# Patient Record
Sex: FEMALE | Race: WHITE | NOT HISPANIC OR LATINO | Employment: STUDENT | URBAN - METROPOLITAN AREA
[De-identification: names, ages, dates, MRNs, and addresses within clinical notes are randomized per-mention and may not be internally consistent; named-entity substitution may affect disease eponyms.]

---
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
## 2021-01-04 ENCOUNTER — HEALTH MAINTENANCE LETTER (OUTPATIENT)
Age: 21
End: 2021-01-04

## 2021-10-11 ENCOUNTER — HEALTH MAINTENANCE LETTER (OUTPATIENT)
Age: 21
End: 2021-10-11

## 2022-01-30 ENCOUNTER — HEALTH MAINTENANCE LETTER (OUTPATIENT)
Age: 22
End: 2022-01-30

## 2022-02-09 ENCOUNTER — APPOINTMENT (OUTPATIENT)
Dept: GENERAL RADIOLOGY | Facility: CLINIC | Age: 22
End: 2022-02-09
Attending: EMERGENCY MEDICINE
Payer: COMMERCIAL

## 2022-02-09 ENCOUNTER — HOSPITAL ENCOUNTER (EMERGENCY)
Facility: CLINIC | Age: 22
Discharge: HOME | End: 2022-02-10
Attending: EMERGENCY MEDICINE
Payer: COMMERCIAL

## 2022-02-09 DIAGNOSIS — M25.519 ANTERIOR SHOULDER PAIN: ICD-10-CM

## 2022-02-09 DIAGNOSIS — M25.512 LEFT SHOULDER PAIN: ICD-10-CM

## 2022-02-09 DIAGNOSIS — S06.0X0A CONCUSSION WITHOUT LOSS OF CONSCIOUSNESS, INITIAL ENCOUNTER: Primary | ICD-10-CM

## 2022-02-09 LAB
HCG UR QL: NEGATIVE
INTERNAL QC OK POCT: NORMAL
POCT KIT EXPIRATION DATE: NORMAL
POCT KIT LOT NUMBER: NORMAL

## 2022-02-09 PROCEDURE — 73000 X-RAY EXAM OF COLLAR BONE: CPT | Mod: LT

## 2022-02-09 PROCEDURE — 73000 X-RAY EXAM OF COLLAR BONE: CPT | Mod: 26 | Performed by: RADIOLOGY

## 2022-02-09 PROCEDURE — 73030 X-RAY EXAM OF SHOULDER: CPT | Mod: 26 | Performed by: RADIOLOGY

## 2022-02-09 PROCEDURE — 99284 EMERGENCY DEPT VISIT MOD MDM: CPT | Performed by: EMERGENCY MEDICINE

## 2022-02-09 PROCEDURE — 81025 URINE PREGNANCY TEST: CPT | Performed by: EMERGENCY MEDICINE

## 2022-02-09 PROCEDURE — 99285 EMERGENCY DEPT VISIT HI MDM: CPT | Mod: 25

## 2022-02-09 PROCEDURE — 73030 X-RAY EXAM OF SHOULDER: CPT | Mod: LT

## 2022-02-09 RX ORDER — IBUPROFEN 800 MG/1
800 TABLET, FILM COATED ORAL ONCE
Status: COMPLETED | OUTPATIENT
Start: 2022-02-09 | End: 2022-02-10

## 2022-02-09 RX ORDER — ACETAMINOPHEN 500 MG
1000 TABLET ORAL ONCE
Status: COMPLETED | OUTPATIENT
Start: 2022-02-09 | End: 2022-02-10

## 2022-02-09 ASSESSMENT — ENCOUNTER SYMPTOMS
FEVER: 0
COLOR CHANGE: 0
COUGH: 0
SHORTNESS OF BREATH: 0
NECK PAIN: 0
SORE THROAT: 0
DIZZINESS: 0
CHEST TIGHTNESS: 0
PALPITATIONS: 0
DYSURIA: 0
HEADACHES: 1
WEAKNESS: 0
ABDOMINAL DISTENTION: 0
ARTHRALGIAS: 0
NAUSEA: 0
MYALGIAS: 0
CONSTIPATION: 0
BACK PAIN: 0
FATIGUE: 0
EYE PAIN: 0
ABDOMINAL PAIN: 0
DIFFICULTY URINATING: 0
VOMITING: 0
DIARRHEA: 0
NECK STIFFNESS: 1
CHILLS: 0
CONFUSION: 0
FREQUENCY: 0

## 2022-02-09 ASSESSMENT — MIFFLIN-ST. JEOR: SCORE: 1573.74

## 2022-02-10 ENCOUNTER — APPOINTMENT (OUTPATIENT)
Dept: CT IMAGING | Facility: CLINIC | Age: 22
End: 2022-02-10
Attending: EMERGENCY MEDICINE
Payer: COMMERCIAL

## 2022-02-10 VITALS
SYSTOLIC BLOOD PRESSURE: 148 MMHG | BODY MASS INDEX: 26.68 KG/M2 | HEIGHT: 67 IN | OXYGEN SATURATION: 100 % | DIASTOLIC BLOOD PRESSURE: 82 MMHG | RESPIRATION RATE: 16 BRPM | WEIGHT: 170 LBS | HEART RATE: 97 BPM | TEMPERATURE: 98.2 F

## 2022-02-10 PROCEDURE — 72125 CT NECK SPINE W/O DYE: CPT | Mod: 26 | Performed by: RADIOLOGY

## 2022-02-10 PROCEDURE — 70450 CT HEAD/BRAIN W/O DYE: CPT | Mod: 26 | Performed by: RADIOLOGY

## 2022-02-10 PROCEDURE — 72125 CT NECK SPINE W/O DYE: CPT

## 2022-02-10 PROCEDURE — 250N000011 HC RX IP 250 OP 636: Performed by: EMERGENCY MEDICINE

## 2022-02-10 PROCEDURE — 70450 CT HEAD/BRAIN W/O DYE: CPT

## 2022-02-10 PROCEDURE — 250N000013 HC RX MED GY IP 250 OP 250 PS 637: Performed by: EMERGENCY MEDICINE

## 2022-02-10 RX ORDER — ONDANSETRON 4 MG/1
4 TABLET, ORALLY DISINTEGRATING ORAL EVERY 8 HOURS PRN
Qty: 10 TABLET | Refills: 0 | Status: SHIPPED | OUTPATIENT
Start: 2022-02-10

## 2022-02-10 RX ORDER — ONDANSETRON 4 MG/1
4 TABLET, ORALLY DISINTEGRATING ORAL ONCE
Status: COMPLETED | OUTPATIENT
Start: 2022-02-10 | End: 2022-02-10

## 2022-02-10 RX ADMIN — IBUPROFEN 800 MG: 800 TABLET, FILM COATED ORAL at 00:17

## 2022-02-10 RX ADMIN — ACETAMINOPHEN 1000 MG: 500 TABLET ORAL at 00:17

## 2022-02-10 RX ADMIN — ONDANSETRON 4 MG: 4 TABLET, ORALLY DISINTEGRATING ORAL at 00:17

## 2022-02-10 NOTE — ED PROVIDER NOTES
[place] EMERGENCY DEPARTMENT ([facility])  2/09/22      History     Chief Complaint   Patient presents with     Fall     HPI  Josefina Harding is a 21 year old female who presents to the ED for evaluation of fall.  Fall occurred approximately 7 PM tonight.  She was carrying some bags slipped on the ice fell forward and hit the front of her head.  She denies any loss of consciousness.  She also fell on her left chest wall and shoulder.  She has pain to the left clavicle and shoulder.  Gets worse with any movement of the shoulder.  She has not taken any medication for this.  She continues to have a frontal headache which she describes as pressure, currently 6 out of 10.  Endorses photophobia.  Has tightness in the shoulder that radiates into the left side of the neck.  Denies blurred vision/vision change, nausea/vomiting, chest pain, shortness of breath, preceding syncope/seizure activity, and has no other medical complaints.    Past Medical History  No past medical history on file.  No past surgical history on file.  ondansetron (ZOFRAN-ODT) 4 MG ODT tab      Allergies   Allergen Reactions     Amoxicillin Hives     Azithromycin Hives     Family History  No family history on file.  Social History   Social History     Tobacco Use     Smoking status: Not on file     Smokeless tobacco: Not on file   Substance Use Topics     Alcohol use: Not on file     Drug use: Not on file      Past medical history, past surgical history, medications, allergies, family history, and social history were reviewed with the patient. No additional pertinent items.       Review of Systems   Constitutional: Negative for chills, fatigue and fever.   HENT: Negative for congestion and sore throat.    Eyes: Negative for pain and visual disturbance.   Respiratory: Negative for cough, chest tightness and shortness of breath.    Cardiovascular: Negative for chest pain and palpitations.   Gastrointestinal: Negative for abdominal distention,  "abdominal pain, constipation, diarrhea, nausea and vomiting.   Genitourinary: Negative for difficulty urinating, dysuria, frequency and urgency.   Musculoskeletal: Positive for neck stiffness. Negative for arthralgias, back pain, myalgias and neck pain.        Left shoulder/clavicle pain   Skin: Negative for color change and rash.   Neurological: Positive for headaches. Negative for dizziness and weakness.   Psychiatric/Behavioral: Negative for confusion.     A complete review of systems was performed with pertinent positives and negatives noted in the HPI, and all other systems negative.    Physical Exam   BP: 116/62  Pulse: 74  Temp: 98.2  F (36.8  C)  Resp: 15  Height: 170.2 cm (5' 7\")  Weight: 77.1 kg (170 lb)  SpO2: 99 %  Physical Exam  Vitals and nursing note reviewed.   Constitutional:       General: She is not in acute distress.     Appearance: Normal appearance.   HENT:      Head: Normocephalic and atraumatic.      Comments: No hemotympanum, raccoon eyes, rivera sign, rhinorrhea.  No abrasions, lacerations, ecchymosis, edema, or other signs of trauma.     Nose: Nose normal.   Eyes:      Pupils: Pupils are equal, round, and reactive to light.   Neck:     Cardiovascular:      Rate and Rhythm: Normal rate and regular rhythm.   Pulmonary:      Effort: Pulmonary effort is normal.   Chest:       Abdominal:      General: There is no distension.   Musculoskeletal:         General: No deformity. Normal range of motion.      Cervical back: Normal range of motion and neck supple. Tenderness present. No rigidity.   Lymphadenopathy:      Cervical: No cervical adenopathy.   Skin:     General: Skin is warm.   Neurological:      Mental Status: She is alert and oriented to person, place, and time.   Psychiatric:         Mood and Affect: Mood normal.         ED Course      Procedures       The medical record was reviewed and interpreted.  Current images reviewed and interpreted: neg cth, ct cspine, shoulder xray, clavicle " xray.              Results for orders placed or performed during the hospital encounter of 02/09/22   XR Shoulder Left 3 Views     Status: None    Narrative    EXAM: XR SHOULDER LEFT G/E 3 VIEWS  LOCATION: [facility]  DATE/TIME: 2/9/2022 11:46 PM    INDICATION: L shoulder pain  COMPARISON: None.      Impression    IMPRESSION: Normal left shoulder joint spaces and alignment. No fracture.    XR Clavicle Left 2 Views     Status: None    Narrative    EXAM: XR CLAVICLE LT 2 VIEWS  LOCATION: [facility]  DATE/TIME: 2/9/2022 11:47 PM    INDICATION: L shoulder clavicle pain  COMPARISON: None.      Impression    IMPRESSION: Normal left clavicle. No fracture. AC joint is preserved.   Head CT w/o contrast     Status: None    Narrative    EXAM: CT HEAD W/O CONTRAST, CT CERVICAL SPINE W/O CONTRAST  LOCATION: [facility]  DATE/TIME: 2/10/2022 12:22 AM    INDICATION: Traumatic injury. Frontal headache. Neck pain.  COMPARISON: None.  TECHNIQUE:   1) Routine CT Head without IV contrast. Multiplanar reformats. Dose reduction techniques were used.  2) Routine CT Cervical Spine without IV contrast. Multiplanar reformats. Dose reduction techniques were used.    FINDINGS:   HEAD CT:   INTRACRANIAL CONTENTS: No intracranial hemorrhage, extraaxial collection, or mass effect.  No CT evidence of acute infarct. Normal parenchymal attenuation. Normal ventricles and sulci.     VISUALIZED ORBITS/SINUSES/MASTOIDS: No intraorbital abnormality. No paranasal sinus mucosal disease. No middle ear or mastoid effusion.    BONES/SOFT TISSUES: No acute abnormality.    CERVICAL SPINE CT:   VERTEBRA: Straightening of usual cervical lordosis. Normal vertebral body heights and alignment. No fracture or posttraumatic subluxation.     CANAL/FORAMINA: No canal or neural foraminal stenosis.    PARASPINAL: No extraspinal  abnormality. Visualized lung fields are clear.      Impression    IMPRESSION:  HEAD CT:  1.  No acute intracranial process.    CERVICAL SPINE CT:  1.  No CT evidence for acute fracture or post traumatic subluxation.  2.  Slight reversal of the usual cervical lordosis.   Cervical spine CT w/o contrast     Status: None    Narrative    EXAM: CT HEAD W/O CONTRAST, CT CERVICAL SPINE W/O CONTRAST  LOCATION: [facility]  DATE/TIME: 2/10/2022 12:22 AM    INDICATION: Traumatic injury. Frontal headache. Neck pain.  COMPARISON: None.  TECHNIQUE:   1) Routine CT Head without IV contrast. Multiplanar reformats. Dose reduction techniques were used.  2) Routine CT Cervical Spine without IV contrast. Multiplanar reformats. Dose reduction techniques were used.    FINDINGS:   HEAD CT:   INTRACRANIAL CONTENTS: No intracranial hemorrhage, extraaxial collection, or mass effect.  No CT evidence of acute infarct. Normal parenchymal attenuation. Normal ventricles and sulci.     VISUALIZED ORBITS/SINUSES/MASTOIDS: No intraorbital abnormality. No paranasal sinus mucosal disease. No middle ear or mastoid effusion.    BONES/SOFT TISSUES: No acute abnormality.    CERVICAL SPINE CT:   VERTEBRA: Straightening of usual cervical lordosis. Normal vertebral body heights and alignment. No fracture or posttraumatic subluxation.     CANAL/FORAMINA: No canal or neural foraminal stenosis.    PARASPINAL: No extraspinal abnormality. Visualized lung fields are clear.      Impression    IMPRESSION:  HEAD CT:  1.  No acute intracranial process.    CERVICAL SPINE CT:  1.  No CT evidence for acute fracture or post traumatic subluxation.  2.  Slight reversal of the usual cervical lordosis.   hCG qual urine POCT     Status: Normal   Result Value Ref Range    HCG Qual Urine Negative Negative    Internal QC Check POCT Valid Valid    POCT Kit Lot Number [id number]     POCT Kit Expiration Date 1/31/23      Medications    acetaminophen (TYLENOL) tablet 1,000 mg (1,000 mg Oral Given 2/10/22 0017)   ibuprofen (ADVIL/MOTRIN) tablet 800 mg (800 mg Oral Given 2/10/22 0017)   ondansetron (ZOFRAN-ODT) ODT tab 4 mg (4 mg Oral Given 2/10/22 0017)        Assessments & Plan (with Medical Decision Making)   Patient presents to the ED for evaluation of headache and left shoulder/pelvic pain after a ground-level fall.    Differential for shoulder includes clavicular fracture, AC joint separation, shoulder dislocation, humeral head fracture    Differential for close head injury includes skull fracture, intracranial hemorrhage, concussion    On arrival, patient is normal vital signs.  She overall appears well nontoxic.  There is tenderness palpation over the distal clavicle, AC joint, proximal humerus.  No crepitus.  No respiratory distress.  Lungs are clear.  Low suspicion for pneumothorax or other chest pathology    Patient has some hypertonicity of the left trapezius muscles rating up to the neck.  Neck is otherwise soft and supple with full range of motion, suspicion for cervical spine fracture/dislocation.    We discussed risk/benefits of CT scan.  Patient is adamant that she would like a CT scan to make absolutely sure that there is no skull fracture, bleeding, swelling, cervical spine fracture/dislocation.    CT scan negative for acute pathology.  X-ray negative as well.    On reassessment, patient continues to have pain, primarily over the anterior glenohumeral joint.  She does have some pain/weakness with resistance to external rotation, raising suspicion for injury to infraspinatus muscle.  She does have some muscle strength there is a low suspicion for complete tear.  We discussed anti-inflammatories, ice, need for PCP follow-up in 1 week.  She may ultimately need physical therapy and/or MRI if her symptoms do not improve.    Regarding her head injury, low suspicion for delayed bleed.  Symptoms likely consistent with mild concussion.   We discussed concussion protocol and need for PCP reassessment in 1 week.  I have reviewed the nursing notes. I have reviewed the findings, diagnosis, plan and need for follow up with the patient.    Discharge Medication List as of 2/10/2022  1:33 AM      START taking these medications    Details   ondansetron (ZOFRAN-ODT) 4 MG ODT tab Take 1 tablet (4 mg) by mouth every 8 hours as needed for nausea, Disp-10 tablet, R-0, Local Print             Final diagnoses:   Concussion without loss of consciousness, initial encounter   Anterior shoulder pain       --  Dawit Hood DO  [facility] EMERGENCY DEPARTMENT  2/9/2022     Dawit Hood DO  02/10/22 0214

## 2022-02-10 NOTE — ED TRIAGE NOTES
"Pt was walking and slipped on ice hitting her forehead. She states she had a brief second of \"everything went white,\" but states that she was able to get up and walk back inside. She is unsure if she LOC.    She reports pain in her collar bone and left arm. 6/10 pain constant.    Pt has a headache 3/10 pain but denies blurry or double vision. She states she has some light sensitivity.     VSS in triage.   "

## 2022-02-10 NOTE — DISCHARGE INSTRUCTIONS
Your work-up in the emergency department including x-rays of your shoulder/clavicle and CT head and neck, did not reveal any serious traumatic injuries.  Shoulders likely caused by injury/inflammation to the rotator cuff muscles.  Make sure to take ibuprofen, 600 mg 3 times a day, for the next 3 to 5 days.  Apply ice to the shoulder for 15 minutes 4-6 times per day.    Regarding the head injury, you likely have a mild concussion.  You should avoid strenuous activity.  Avoid bright lights/sounds.    For both issues, you will need reassessment by primary care physician within the next week.  Have placed a consult to have you referred for a new doctor.  Return to the ED with any new or worsening issues.

## 2022-02-11 NOTE — TELEPHONE ENCOUNTER
DIAGNOSIS: L shoulder ED f/u fell on the ice , per pt seen in ED, Images in chart   APPOINTMENT DATE: 2.16.22   NOTES STATUS DETAILS   DISCHARGE REPORT from the ER Internal 2.9.22 [facility] ED   MEDICATION LIST Internal    CT SCAN Internal 2.10.22 cervical spine   XRAYS (IMAGES & REPORTS) Internal 2.9.22 L clavicle, L shoulder

## 2022-02-16 ENCOUNTER — OFFICE VISIT (OUTPATIENT)
Dept: ORTHOPEDICS | Facility: CLINIC | Age: 22
End: 2022-02-16
Payer: COMMERCIAL

## 2022-02-16 ENCOUNTER — PRE VISIT (OUTPATIENT)
Dept: ORTHOPEDICS | Facility: CLINIC | Age: 22
End: 2022-02-16

## 2022-02-16 ENCOUNTER — OFFICE VISIT (OUTPATIENT)
Dept: PEDIATRICS | Facility: CLINIC | Age: 22
End: 2022-02-16
Payer: COMMERCIAL

## 2022-02-16 VITALS — HEIGHT: 67 IN | BODY MASS INDEX: 26.56 KG/M2 | WEIGHT: 169.2 LBS | TEMPERATURE: 98 F

## 2022-02-16 VITALS — HEIGHT: 67 IN | BODY MASS INDEX: 26.68 KG/M2 | WEIGHT: 170 LBS

## 2022-02-16 DIAGNOSIS — S43.102A AC SEPARATION, LEFT, INITIAL ENCOUNTER: Primary | ICD-10-CM

## 2022-02-16 DIAGNOSIS — Z09 FOLLOW-UP EXAM: Primary | ICD-10-CM

## 2022-02-16 PROCEDURE — 99213 OFFICE O/P EST LOW 20 MIN: CPT | Performed by: NURSE PRACTITIONER

## 2022-02-16 PROCEDURE — 99203 OFFICE O/P NEW LOW 30 MIN: CPT | Performed by: FAMILY MEDICINE

## 2022-02-16 NOTE — PROGRESS NOTES
[facility]  SPORTS & ORTHOPEDIC CLINIC VISIT     Feb 16, 2022        ASSESSMENT & PLAN    21-year-old 1 week status post left AC separation. Doing well. Clinically improving    Reviewed imaging and assessment with patient in detail  Provided with HEP. She will contact us if she would like formal physical therapy  Okay to continue regular daily icing as well as as needed ibuprofen.  She can return to activity as tolerated  Follow-up in clinic if not improving along expected timeline    Wyatt Elizondo MD  [facility] SPORTS MEDICINE [facility] [place]    -----  Chief Complaint   Patient presents with     Consult     left shoulder       SUBJECTIVE  Josefina Harding is a/an 21 year old female who is seen as a self referral for evaluation of left acromioclavicular joint pain after a fall on the ice on 2/9/22. Patient was seen in the ER where her left shoulder and concussion were evaluated.     The patient is seen by themselves.  The patient is Right handed    Onset: 1 week(s) ago. Patient describes injury as slipped on the ice while walking in her parking ramp. Landed on left shoulder  Location of Pain: left AC joint  Worsened by: reaching across body and overhead motions.  Better with: ice, rest, Advil and Tylenol as needed (for concussion).  Treatments tried: rest/activity avoidance, ice, Tylenol and ibuprofen  Associated symptoms: swelling, locking or catching and pain.     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       REVIEW OF SYSTEMS:    Do you have fever, chills, weight loss? No    Do you have any vision problems? Yes, light sensitivity from concussion.    Do you have any chest pain or edema? No    Do you have any shortness of breath or wheezing?  No    Do you have stomach problems? No    Do you have any numbness or focal weakness? No    Do you have diabetes? No    Do you have problems with bleeding or  "clotting? No    Do you have an rashes or other skin lesions? No    OBJECTIVE:  Ht 1.702 m (5' 7\")   Wt 77.1 kg (170 lb)   BMI 26.63 kg/m       EXAM:  Alert, pleasant and conversational    Neck with full AROM, non-tender to midline cervical and upper thoracic spine palpation, negative Spurling's.  Elbow with FROM and non-tender to palpation      left shoulder:   Skin intact. No skin changes, deformity or atrophy    AROM:   Forward Flexion: 180  with some pain at terminal flexion  Abduction: 180    External rotation: ~70    Internal rotation: T7.       Strength testing:   Abduction: 5/5, with some pain  External rotation: 5/5   Internal rotation 5/5     Deltoids 5/5, Biceps 5/5, Triceps 5/5,  5/5.    Palpation: positive  TTP of the Acromioclavicular joint  negative TTP of Sternoclavicular joint  negative TTP of posterior glenoid  negative TTP of scapular borders  negative TTP of the bicipital tendon.     Special Tests: negative Julian test  positive  Neer's test.   positive Eddy's test   negative Speed's test.    Neurovascularly intact bilateral upper extremities.      RADIOLOGY:    3 view xrays of left shoulder performed 2/9/2022 and reviewed independently demonstrating acute fractures location. No DJD. See EMR for formal radiology report.            "

## 2022-02-16 NOTE — PROGRESS NOTES
"  Assessment & Plan     Follow-up exam  For concussion. Josefina still having occasional headache, bruising is gone. Left shoulder is bruised still. Discussed decreasing screen time right before bed time, humidifier with essential oils to relax, ibuprofen and tylenol for the headaches. Hydration and rolling out an tight muscles in the neck. Provided exercising tips.    She is able to increase activities as she feels able.      Review of external notes as documented elsewhere in note  20 minutes spent on the date of the encounter doing chart review, history and exam, documentation and further activities per the note       BMI:   Estimated body mass index is 26.25 kg/m  as calculated from the following:    Height as of this encounter: 5' 7.32\" (1.71 m).    Weight as of this encounter: 169 lb 3.2 oz (76.7 kg).       See Patient Instructions    No follow-ups on file.    Dea Mchugh, ISAK CNP  [facility]    [facility]   Josefina is a 21 year old who presents for the following health issues  accompanied by her byself.    [facility]     ED/UC Followup:    Facility:  [facility]  Date of visit: 2/9/22  Reason for visit: Concussion   Current Status: she feeling better        21 year old slipped on ice and hit the frontal left side of head. Was seen in the ED and has headaches occasionally after the incident. It has been 7 days out and she is following up to be cleared to continue activities. Josefina admitted she did not rest from screen time and went back to work right away. See ROS below.    Review of Systems   Constitutional, HEENT, cardiovascular, pulmonary, GI, , musculoskeletal, neuro, skin, endocrine and psych systems are negative, except as otherwise noted.      Objective    Temp 98  F (36.7  C) (Oral)   Ht 5' 7.32\" (1.71 m)   Wt 169 lb 3.2 oz (76.7 kg)   BMI 26.25 kg/m    Body mass index is 26.25 kg/m .  Physical Exam   GENERAL: healthy, alert and no distress  EYES: Eyes grossly normal to inspection, PERRL and " conjunctivae and sclerae normal  HENT: ear canals and TM's normal, nose and mouth without ulcers or lesions  NECK: no adenopathy, no asymmetry, masses, or scars and thyroid normal to palpation  RESP: lungs clear to auscultation - no rales, rhonchi or wheezes  BREAST: normal without masses, tenderness or nipple discharge and no palpable axillary masses or adenopathy  CV: regular rate and rhythm, normal S1 S2, no S3 or S4, no murmur, click or rub, no peripheral edema and peripheral pulses strong  ABDOMEN: soft, nontender, no hepatosplenomegaly, no masses and bowel sounds normal  MS: no gross musculoskeletal defects noted, no edema  SKIN: no suspicious lesions or rashes  NEURO: Normal strength and tone, mentation intact and speech normal  PSYCH: mentation appears normal, affect normal/bright

## 2022-02-16 NOTE — PATIENT INSTRUCTIONS
Wand exercise, flexion: Stand upright and hold a stick in both hands, palms down. Stretch your arms by lifting them over your head, keeping your arms straight. Hold for 5 seconds and return to the starting position. Repeat 10 times.  Wand exercise, extension: Stand upright and hold a stick in both hands behind your back. Move the stick away from your back. Hold this position for 5 seconds. Relax and return to the starting position. Repeat 10 times.    Wand exercise, external rotation: Lie on your back and hold a stick in both hands, palms up. Your upper arms should be resting on the floor with your elbows at your sides and bent 90 degrees. Use your uninjured arm to push your injured arm out away from your body. Keep the elbow of your injured arm at your side while it is being pushed. Hold the stretch for 5 seconds. Repeat 10 times.    Wand exercise, internal rotation: Stand with your uninjured arm behind your head holding the end of a stick. Put your injured arm behind your back at your waist and grab the stick. Pull the stick up behind your back by straightening the elbow of your uninjured arm and bending the elbow of your injured arm. Hold this position for 5 seconds and then go back to the starting position. Repeat 10 times.    Wand exercise, shoulder abduction and adduction: Stand and hold a stick with both hands, palms facing away from your body. Rest the stick against the front of your thighs. Use your uninjured arm to push your injured arm out to the side and up as high as possible. Keep your arms straight. Hold for 5 seconds. Repeat 10 times.    Wand exercise, horizontal abduction and adduction: Stand and hold a stick in both hands. Stretch your arms straight out in front of you at shoulder height. Keeping your arms straight, swing the stick to one side. Feel the stretch and hold for 5 seconds. Then swing the stick to the other side, feel the stretch, and hold for 5 seconds. Repeat 10 times.    Shoulder  flexion: Stand with your arms hanging down at your sides. Keep your arms straight and lift them in front of you and up over your head as far as you can reach. Hold this position for 5 seconds and then bring your arms back down in front of you and to your sides. Do 2 sets of 15.    Shoulder abduction: Stand with your arms at your sides. Bring your arms up, out to the side, and toward the ceiling. Hold for 5 seconds. Return to the starting position. Repeat 10 times.    Horizontal shoulder abduction: Stand with your arms held straight out in front of you at shoulder height. Pull your arms apart and out to the sides as far as possible. Hold your arms back for 5 seconds, then bring them back together in front of you. Repeat 10 times. Remember to keep your arms at shoulder height throughout the exercise.  Shoulder extension: Stand with your arms at your sides. Move the arm on your injured side back, keeping the arm straight. Hold this position for 5 seconds. Return to the starting position and repeat 10 times.    Scapular active range of motion: Stand and shrug your shoulders up and hold for 5 seconds. Then squeeze your shoulder blades back and together and hold 5 seconds. Next, pull your shoulder blades downward as if putting them in your back pocket. Relax. Repeat this sequence 10 times.    Side-lying horizontal abduction: Lie on your uninjured side with the arm on your injured side relaxed across your chest. Slowly bring this arm up off the floor so that your hand is pointing toward the ceiling. Keep your arm straight as you do this. Do 2 sets of 15. Hold a weight in your hand as the exercise becomes easier.    Prone shoulder extension: Lie on your stomach on a table or the edge of a bed with the arm on your injured side hanging down over the edge. Slowly lift your arm straight back and toward the ceiling. Do not bend your elbow. Return to the starting position. Do 2 sets of 15. As this becomes easier, hold a weight in  your hand.    Single-arm shoulder abduction: Stand with your arms at your sides, your palms resting against your sides. Lift the arm on your injured side out to the side and toward the ceiling. Keep your arm straight. Hold the position for 5 seconds and then bring your arm back to your side. Repeat 10 times. Add a weight to your hand as the exercise gets easier.    Resisted shoulder internal rotation: Stand sideways next to a door with your injured arm closest to the door. Tie a knot in the end of the tubing and shut the knot in the door at waist level. Hold the other end of the tubing with the hand of your injured arm. Bend the elbow of your injured arm 90 degrees. Keeping your elbow in at your side, rotate your forearm across your body and then slowly back to the starting position. Make sure you keep your forearm parallel to the floor. Do 2 sets of 8 to 12.    Resisted shoulder adduction: Stand sideways next to a door with your injured side closer to the door. Stand about a shoulder's length from the door. Tie a knot in the end of the tubing and shut the knot in the door at shoulder height. Hold the tubing with the hand on your injured side at shoulder height. Slowly pull the tubing down toward your side. Do 2 sets of 15.    Resisted shoulder flexion: Holding tubing connected to a door knob at waist level, face away from the door, keep your elbow straight and pull your arm forward. Do 2 sets of 15.  Resisted shoulder extension: Stand facing a door. Tie a knot in the end of the tubing and shut the knot in the door at shoulder height. Use the hand on your injured side to hold the tubing at shoulder height. Pull your arm back, keeping your arm straight. Do 2 sets of 15.    Resisted shoulder external rotation: Stand sideways next to a door with your injured arm farther from the door. Tie a knot in the end of the tubing and shut the knot in the door at waist level. Hold the other end of the tubing with the hand of your  injured arm. Rest the hand of your injured arm across your stomach. Keeping your elbow in at your side, rotate your arm outward and away from your waist. Slowly return your arm to the starting position. Make sure you keep your elbow bent 90 degrees and your forearm parallel to the floor. Repeat 10 times. Build up to 2 sets of 15.    Developed by [employer].  Published by [employer].  Copyright  2014 [employer] and/or one of its subsidiaries. All rights reserved.

## 2022-02-16 NOTE — PATIENT INSTRUCTIONS
Patient Education     After a Concussion  If you had a mild concussion (a head injury), watch closely for signs of problems during the first 48 hours after the injury. Follow the doctor s advice about recovering at home. Use the tips on this handout as a guide.      Awaken to check alertness as often as the health care provider suggests.   Note: You should not be left alone after a concussion. If no adult can stay with the injured person, let the doctor know.   Have someone call 911 or your emergency number if you can't fully wake up or have a seizures or convulsions.   The first 48 hours  Don t take medicine unless approved by your healthcare provider. Try placing a cold, damp cloth on your head to help relieve a headache.     Ask the doctor before using any medicines.    Don't drink alcohol or take sedatives or medicines that make you sleepy.    Don't return to sports or any activity that could cause you to hit your head until all symptoms are gone and your doctor says it's OK. A second head injury before fully recovering from the first one can lead to serious brain injury.    Don't do activities that need a lot of concentration or a lot of attention. This will let your brain rest and heal faster.    Return to regular physical and mental activity as directed with your doctor's OK.  Tips about sleeping  For the first day or two, it may be best not to sleep for long periods of time without being checked for alertness. Follow the doctor s instructions.   ? Have someone wake you every ____ hours for the next ____ hours. He or she should ask you questions to check for alertness.   ? OK to sleep through the night.   When to call the healthcare provider  If you notice any of the following, call the healthcare provider:     Vomiting. Some vomiting is common, but tell the provider about any vomiting.    Clear or bloody drainage from the nose or ear    Constant drowsiness or trouble waking up    Confusion or memory  loss    Blurred vision or any vision changes    Inability to walk or talk normally    Increased weakness or problems with coordination    Constant, unrelieved headache that becomes more severe    Changes in behavior or personality    High-pitched crying in infants    Signs of stroke such as paralysis of parts of the body    Uncontrolled movements suggesting a seizure    Loss of bowel or bladder control  [employer] last reviewed this educational content on 3/1/2020    3371-2015 [employer]. All rights reserved. This information is not intended as a substitute for professional medical care. Always follow your healthcare professional's instructions.           Patient Education     Coping with Concussion  Concussion is also known as mild traumatic brain injury (MTBI). It is often caused by a blow to the head, or a fall. You may have been unconscious for a few seconds or minutes after the injury. Or maybe you were dazed, confused, or  saw stars.  After this, you thought you were OK. Now, weeks or months later, you re having symptoms that may be caused by a concussion. The good news is that, in most people,  these symptoms will likely go away on their own. Most people with a concussion recover fully, with no need for treatment.     A cold compress can help relieve a headache.    What is a concussion?  A concussion is a mild form of brain injury. In some cases, the effects of a concussion go away within days of the injury. In others, symptoms may continue for a few months. Fortunately, a concussion is temporary. Even when symptoms stay for months, they do go away over time. If they don't, or if your symptoms are worse, contact your healthcare provider.  Symptoms of a concussion  You may have noticed some of these symptoms:    Headaches    Irritability and other changes in behavior    Problems remembering or concentrating    Dizziness or lack of coordination    Fatigue    Problems sleeping    Sensitivity to  light and sound    Vision changes  NOTE: If you have severe symptoms or trouble functioning, talk with your healthcare provider right away. If you had a more serious head injury than a concussion, you likely need treatment. Be sure to see your healthcare provider for an evaluation.  What you can do  Since the effects of a concussion go away over time, there isn t a lot you need to do. Be assured that this problem is temporary. You ll likely have a full recovery. In the meantime, talk with your healthcare provider about ways to relieve any symptoms that are bothering you. These tips may help:    Don't return to sports or any activity that could cause you to hit your head until all symptoms are gone and you have been cleared by your doctor. A second head injury before fully recovering from the first one can lead to serious brain injury.    Return to normal activities of daily living and normal social interaction is encouraged to speed recovery.    Stress can make symptoms worse. Help calm yourself by resting in a quiet place and imagining a peaceful scene. Relax your muscles by soaking in a hot bath or taking a hot shower.    Take over-the-counter  acetaminophen to relieve headache pain. Take them as directed on the package. Don't take ibuprofen or aspirin after a head injury.    If you become dizzy, sit or lie down in a safe place until the sensation passes. Don t drive when you feel dizzy or disoriented.    If you re having trouble sleeping, try to keep a regular sleep schedule. Go to bed and get up at the same time each day. Avoid or limit caffeine and nicotine. Also don't drink alcohol. It may help you sleep at first, but your sleep will not be restful.    Give yourself time to heal. Your recovery will take some time. When you have symptoms, remember that you won t feel this way forever. In time the symptoms will go away and you ll be back to yourself.  If you re not feeling better  The effects of a concussion  often go away in 7 to 10 days and the vast majority of people who have had a concussion have recovered after 3 months. If you re not feeling better as time passes, there may be something else going on. If your symptoms don t go away or you notice new ones, talk with your healthcare provider. He or she can help you get the treatment you need.  [employer] last reviewed this educational content on 1/1/2018 2000-2021 [employer]. All rights reserved. This information is not intended as a substitute for professional medical care. Always follow your healthcare professional's instructions.

## 2022-02-16 NOTE — LETTER
Date:February 17, 2022      Patient was self referred, no letter generated. Do not send.        [facility] Health Information

## 2022-02-16 NOTE — LETTER
2/16/2022      RE: Josefina Harding  [address]  [facility] [address]         [facility]  SPORTS & ORTHOPEDIC CLINIC VISIT     Feb 16, 2022        ASSESSMENT & PLAN    21-year-old 1 week status post left AC separation. Doing well. Clinically improving    Reviewed imaging and assessment with patient in detail  Provided with HEP. She will contact us if she would like formal physical therapy  Okay to continue regular daily icing as well as as needed ibuprofen.  She can return to activity as tolerated  Follow-up in clinic if not improving along expected timeline    Wyatt Elizondo MD  [facility] SPORTS MEDICINE [facility]    -----  Chief Complaint   Patient presents with     Consult     left shoulder       SUBJECTIVE  Josefina Harding is a/an 21 year old female who is seen as a self referral for evaluation of left acromioclavicular joint pain after a fall on the ice on 2/9/22. Patient was seen in the ER where her left shoulder and concussion were evaluated.     The patient is seen by themselves.  The patient is Right handed    Onset: 1 week(s) ago. Patient describes injury as slipped on the ice while walking in her parking ramp. Landed on left shoulder  Location of Pain: left AC joint  Worsened by: reaching across body and overhead motions.  Better with: ice, rest, Advil and Tylenol as needed (for concussion).  Treatments tried: rest/activity avoidance, ice, Tylenol and ibuprofen  Associated symptoms: swelling, locking or catching and pain.     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       REVIEW OF SYSTEMS:    Do you have fever, chills, weight loss? No    Do you have any vision problems? Yes, light sensitivity from concussion.    Do you have any chest pain or edema? No    Do you have any shortness of breath or wheezing?  No    Do you have stomach problems? No    Do you have any numbness or focal  "weakness? No    Do you have diabetes? No    Do you have problems with bleeding or clotting? No    Do you have an rashes or other skin lesions? No    OBJECTIVE:  Ht 1.702 m (5' 7\")   Wt 77.1 kg (170 lb)   BMI 26.63 kg/m       EXAM:  Alert, pleasant and conversational    Neck with full AROM, non-tender to midline cervical and upper thoracic spine palpation, negative Spurling's.  Elbow with FROM and non-tender to palpation      left shoulder:   Skin intact. No skin changes, deformity or atrophy    AROM:   Forward Flexion: 180  with some pain at terminal flexion  Abduction: 180    External rotation: ~70    Internal rotation: T7.       Strength testing:   Abduction: 5/5, with some pain  External rotation: 5/5   Internal rotation 5/5     Deltoids 5/5, Biceps 5/5, Triceps 5/5,  5/5.    Palpation: positive  TTP of the Acromioclavicular joint  negative TTP of Sternoclavicular joint  negative TTP of posterior glenoid  negative TTP of scapular borders  negative TTP of the bicipital tendon.     Special Tests: negative Julian test  positive  Neer's test.   positive Denver's test   negative Speed's test.    Neurovascularly intact bilateral upper extremities.      RADIOLOGY:    3 view xrays of left shoulder performed 2/9/2022 and reviewed independently demonstrating acute fractures location. No DJD. See EMR for formal radiology report.                Wyatt Elizondo MD    "

## 2022-09-24 ENCOUNTER — HEALTH MAINTENANCE LETTER (OUTPATIENT)
Age: 22
End: 2022-09-24

## 2023-05-08 ENCOUNTER — HEALTH MAINTENANCE LETTER (OUTPATIENT)
Age: 23
End: 2023-05-08

## 2024-07-14 ENCOUNTER — HEALTH MAINTENANCE LETTER (OUTPATIENT)
Age: 24
End: 2024-07-14